# Patient Record
Sex: FEMALE | Race: WHITE
[De-identification: names, ages, dates, MRNs, and addresses within clinical notes are randomized per-mention and may not be internally consistent; named-entity substitution may affect disease eponyms.]

---

## 2018-04-23 ENCOUNTER — HOSPITAL ENCOUNTER (EMERGENCY)
Dept: HOSPITAL 31 - C.ER | Age: 23
Discharge: HOME | End: 2018-04-23
Payer: SELF-PAY

## 2018-04-23 VITALS — SYSTOLIC BLOOD PRESSURE: 144 MMHG | DIASTOLIC BLOOD PRESSURE: 91 MMHG | OXYGEN SATURATION: 99 % | TEMPERATURE: 98.5 F

## 2018-04-23 VITALS — HEART RATE: 99 BPM | RESPIRATION RATE: 18 BRPM

## 2018-04-23 DIAGNOSIS — J06.9: Primary | ICD-10-CM

## 2018-04-23 NOTE — C.PDOC
History Of Present Illness


22 year old female presents to the ED with sinus congestion, sore throat, and 

right ear discharge which began yesterday. Patient also reports subjective 

fever. Denies trauma. Patient reports yellow ear dicharge and mild right ear 

pain. Denies hearing loss. Patient has not taken medicine PTA. 





SINUS ARCENIO, SORE THROAT, R EAR DC SINCE YEST. SUBJ FEVER. NO TRAUMA. EAR DC = 

YELLOW. MILD R EAR PAIN. NO HEARING LOSS. NO MEDS TAKEN PTA





EXAM


NONTOXIC


HEENT +SINUS ARCENIO W CLEAR RHINORRHEA; B/L TM CLEAR, INTACT; THROAT CLEAR


REMAINDER NEG


Time Seen by Provider: 04/23/18 09:19


Chief Complaint (Nursing): Cough, Cold, Congestion


History Per: Patient


History/Exam Limitations: no limitations


Onset/Duration Of Symptoms: Hrs


Current Symptoms Are (Timing): Still Present


Location Of Pain: Throat, Sinus/es


Ear Symptoms: Left: None ( ), Right: Ear Pain (mild ), Ear Drainage (yellow)


Additional History Per: Patient





Past Medical History


Reviewed: Historical Data, Nursing Documentation, Vital Signs


Vital Signs: 


 Last Vital Signs











Temp  98.5 F   04/23/18 08:59


 


Pulse  99 H  04/23/18 09:20


 


Resp  18   04/23/18 09:20


 


BP  144/91 H  04/23/18 08:59


 


Pulse Ox  99   04/23/18 11:05














- Medical History


PMH: Kidney Stones


Surgical History: No Surg Hx


Family History: States: Unknown Family Hx





- Social History


Hx Alcohol Use: No


Hx Substance Use: No





- Immunization History


Hx Tetanus Toxoid Vaccination: No


Hx Influenza Vaccination: No


Hx Pneumococcal Vaccination: No





Review Of Systems


Constitutional: Positive for: Fever


ENT: Positive for: Ear Pain (mild ), Ear Discharge (right), Throat Pain.  

Negative for: Other (hearing loss )





Physical Exam





- Physical Exam


Appears: Non-toxic, No Acute Distress


Skin: Normal Color, Warm, Dry


Head: Atraumatic, Normacephalic


Eye(s): bilateral: Normal Inspection


Ear(s): Bilateral: Normal, Other (bilateral TM clear and intact )


Nose: Other (sinus congestion with clear rhinorrhea )


Throat: Normal, No Erythema, No Exudate


Neck: Supple


Chest: Symmetrical, No Deformity, No Tenderness


Cardiovascular: Rhythm Regular


Respiratory: Normal Breath Sounds, No Rales, No Rhonchi, No Wheezing


Extremity: Normal ROM, Capillary Refill (less than 2 seconds )


Neurological/Psych: Oriented x3, Normal Speech, Normal Cognition





ED Course And Treatment


O2 Sat by Pulse Oximetry: 99 (on RA)


Pulse Ox Interpretation: Normal





Disposition


Counseled Patient/Family Regarding: Diagnosis, Need For Followup, Rx Given





- Disposition


Referrals: 


 Service [Outside]


[facility] [Outside]


Disposition: HOME/ ROUTINE


Disposition Time: 09:21


Condition: GOOD


Instructions:  Upper Respiratory Infection (ED)


Forms:  [facility] (English)





- Clinical Impression


Clinical Impression: 


 Upper respiratory infection








- Scribe Statement


The provider has reviewed the documentation as recorded by the Scribe (Betzy Peguero)


Provider Attestation: 








All medical record entries made by the Scribe were at my direction and 

personally dictated by me. I have reviewed the chart and agree that the record 

accurately reflects my personal performance of the history, physical exam, 

medical decision making, and the department course for this patient. I have 

also personally directed, reviewed, and agree with the discharge instructions 

and disposition.